# Patient Record
Sex: FEMALE | Race: BLACK OR AFRICAN AMERICAN | NOT HISPANIC OR LATINO | Employment: OTHER | URBAN - NONMETROPOLITAN AREA
[De-identification: names, ages, dates, MRNs, and addresses within clinical notes are randomized per-mention and may not be internally consistent; named-entity substitution may affect disease eponyms.]

---

## 2023-05-02 ENCOUNTER — OFFICE VISIT (OUTPATIENT)
Dept: ENDOCRINOLOGY | Facility: CLINIC | Age: 73
End: 2023-05-02
Payer: MEDICARE

## 2023-05-02 VITALS
HEART RATE: 64 BPM | SYSTOLIC BLOOD PRESSURE: 120 MMHG | HEIGHT: 60 IN | BODY MASS INDEX: 35.14 KG/M2 | DIASTOLIC BLOOD PRESSURE: 80 MMHG | WEIGHT: 179 LBS | OXYGEN SATURATION: 99 %

## 2023-05-02 DIAGNOSIS — N25.81 SECONDARY HYPERPARATHYROIDISM, RENAL: Primary | ICD-10-CM

## 2023-05-02 RX ORDER — LISINOPRIL 40 MG/1
40 TABLET ORAL DAILY
COMMUNITY

## 2023-05-02 RX ORDER — NEBIVOLOL 10 MG/1
10 TABLET ORAL DAILY
COMMUNITY

## 2023-05-02 RX ORDER — CHLORTHALIDONE 25 MG/1
25 TABLET ORAL DAILY
COMMUNITY

## 2023-05-02 RX ORDER — FERROUS SULFATE 325(65) MG
325 TABLET ORAL
COMMUNITY

## 2023-05-02 RX ORDER — DILTIAZEM HYDROCHLORIDE EXTENDED-RELEASE TABLETS 120 MG/1
TABLET, EXTENDED RELEASE ORAL DAILY
COMMUNITY

## 2023-05-02 RX ORDER — LORATADINE 10 MG/1
10 TABLET ORAL AS NEEDED
COMMUNITY

## 2023-05-02 NOTE — PROGRESS NOTES
"Chief Complaint   Patient presents with   • hyperparathyroidism         History of Present Illness    73 y.o. female referred for hyperparathyroidism.    She has chronic kidney disease stage IIIb secondary to hypertension nephrosclerosis.    Labs from referring provider dated December 15, 2022 are:    GFR 39    Vitamin D 104    Calcium 10.0    Phosphorus 3.7    PTH 88    She has no history of fragility fractures and recalls a bone density obtained within the last 2 years revealing osteopenia.    There is no history of nephrolithiasis    Patient feels well, and antihypertensive regimen consists of lisinopril, Bystolic and chlorthalidone along with diltiazem            ==========================================  Physical Exam  /80   Pulse 64   Ht 152.4 cm (60\")   Wt 81.2 kg (179 lb)   SpO2 99%   BMI 34.96 kg/m²   AOx3  No Goiter , no carotid bruit  RRR  CTA  No Edema     ==========================================    Laboratory Workup    No results found for: WBC, HGB, HCT, MCV, PLT    No results found for: GLUCOSE, BUN, CREATININE, EGFRRESULT, EGFR, BCR, NA, K, CL, CO2, CALCIUM, PROTENTOTREF, ANIONGAP, ALBUMIN, LABIL2, BILIRUBIN, AST, ALT      No results found for: EGFR      ==========================================      ICD-10-CM ICD-9-CM   1. Secondary hyperparathyroidism, renal  N25.81 588.81       Patient has secondary hyperparathyroidism due to chronic kidney disease stage IIb and PTH is not higher than 2-9 times the upper limit of normal.      This is not primary hyperparathyroidism, calcium is normal, she has no nephrolithiasis or osteoporosis.    In addition, she could have 2 other factors that could have led to a high normal calcium including vitamin D of 104 at the time of measurement and the use of a thiazide diuretic.    Reassurance, no need for any imaging to localize for a parathyroid adenoma.    She has adequate dietary calcium intake in the form of milk, cheese, yogurt so no supplements " recommended.    I will see patient as needed              This document has been electronically signed by Iker Dial MD on May 2, 2023 13:22 CDT

## 2023-05-02 NOTE — LETTER
"May 2, 2023     Angelina Albarran MD  [id number] [facility] [id number]    Patient: Ángela Truong   YOB: 1950   Date of Visit: 5/2/2023       Dear Dr. Deion MD:    Thank you for referring Ángela Truong to me for evaluation. Below are the relevant portions of my assessment and plan of care.    If you have questions, please do not hesitate to call me. I look forward to following Ángela along with you.         Sincerely,        Iker Dial MD        CC: No Recipients    Iker Thakkar MD  05/02/23 1322  Incomplete  Chief Complaint   Patient presents with   • hyperparathyroidism         History of Present Illness    73 y.o. female referred for hyperparathyroidism.    She has chronic kidney disease stage IIIb secondary to hypertension nephrosclerosis.    Labs from referring provider dated December 15, 2022 are:    GFR 39    Vitamin D 104    Calcium 10.0    Phosphorus 3.7    PTH 88    She has no history of fragility fractures and recalls a bone density obtained within the last 2 years revealing osteopenia.    There is no history of nephrolithiasis    Patient feels well, and antihypertensive regimen consists of lisinopril, Bystolic and chlorthalidone along with diltiazem            ==========================================  Physical Exam  /80   Pulse 64   Ht 152.4 cm (60\")   Wt 81.2 kg (179 lb)   SpO2 99%   BMI 34.96 kg/m²   AOx3  No Goiter , no carotid bruit  RRR  CTA  No Edema     ==========================================    Laboratory Workup    No results found for: WBC, HGB, HCT, MCV, PLT    No results found for: GLUCOSE, BUN, CREATININE, EGFRRESULT, EGFR, BCR, NA, K, CL, CO2, CALCIUM, PROTENTOTREF, ANIONGAP, ALBUMIN, LABIL2, BILIRUBIN, AST, ALT      No results found for: EGFR      ==========================================      ICD-10-CM ICD-9-CM   1. Secondary hyperparathyroidism, renal  N25.81 588.81       Patient has secondary hyperparathyroidism due to " "chronic kidney disease stage IIb and PTH is not higher than 2-9 times the upper limit of normal.      This is not primary hyperparathyroidism, calcium is normal, she has no nephrolithiasis or osteoporosis.    In addition, she could have 2 other factors that could have led to a high normal calcium including vitamin D of 104 at the time of measurement and the use of a thiazide diuretic.    Reassurance, no need for any imaging to localize for a parathyroid adenoma.    She has adequate dietary calcium intake in the form of milk, cheese, yogurt so no supplements recommended.    I will see patient as needed              This document has been electronically signed by Iker Dial MD on May 2, 2023 13:22 CDT                        Iker Thakkar MD  05/02/23 1014  Sign when Signing Visit  Chief Complaint   Patient presents with   • hyperparathyroidism         History of Present Illness    73 y.o. female         ==========================================  Physical Exam  /80   Pulse 64   Ht 152.4 cm (60\")   Wt 81.2 kg (179 lb)   SpO2 99%   BMI 34.96 kg/m²   AOx3  No Goiter , no carotid bruit  RRR  CTA  No Edema     ==========================================    Laboratory Workup    No results found for: WBC, HGB, HCT, MCV, PLT    No results found for: GLUCOSE, BUN, CREATININE, EGFRRESULT, EGFR, BCR, NA, K, CL, CO2, CALCIUM, PROTENTOTREF, ANIONGAP, ALBUMIN, LABIL2, BILIRUBIN, AST, ALT      No results found for: EGFR      ==========================================    No diagnosis found.        No orders of the defined types were placed in this encounter.    No orders of the defined types were placed in this encounter.              This document has been electronically signed by Iker Dial MD on May 2, 2023 10:14 CDT                      "